# Patient Record
Sex: FEMALE | URBAN - METROPOLITAN AREA
[De-identification: names, ages, dates, MRNs, and addresses within clinical notes are randomized per-mention and may not be internally consistent; named-entity substitution may affect disease eponyms.]

---

## 2023-04-21 ENCOUNTER — EMPLOYEE HEALTH (OUTPATIENT)
Dept: OTHER | Facility: HOSPITAL | Age: 23
End: 2023-04-21
Attending: PREVENTIVE MEDICINE

## 2023-04-21 DIAGNOSIS — Z11.1 SCREENING-PULMONARY TB: Primary | ICD-10-CM

## 2023-04-21 PROCEDURE — 86480 TB TEST CELL IMMUN MEASURE: CPT

## 2023-04-24 LAB
M TB IFN-G CD4+ T-CELLS BLD-ACNC: 0 IU/ML
M TB TUBERC IFN-G BLD QL: NEGATIVE
M TB TUBERC IGNF/MITOGEN IGNF CONTROL: >10 IU/ML
QFT TB1 AG MINUS NIL: 0 IU/ML
QFT TB2 AG MINUS NIL: 0.02 IU/ML

## 2023-06-01 ENCOUNTER — TELEPHONE (OUTPATIENT)
Dept: INTERNAL MEDICINE CLINIC | Facility: HOSPITAL | Age: 23
End: 2023-06-01

## 2023-06-01 ENCOUNTER — LAB ENCOUNTER (OUTPATIENT)
Dept: LAB | Facility: HOSPITAL | Age: 23
End: 2023-06-01
Attending: PREVENTIVE MEDICINE

## 2023-06-01 DIAGNOSIS — Z20.822 SUSPECTED 2019 NOVEL CORONAVIRUS INFECTION: ICD-10-CM

## 2023-06-01 DIAGNOSIS — Z20.822 SUSPECTED 2019 NOVEL CORONAVIRUS INFECTION: Primary | ICD-10-CM

## 2023-06-01 LAB — SARS-COV-2 RNA RESP QL NAA+PROBE: NOT DETECTED

## 2023-06-01 NOTE — TELEPHONE ENCOUNTER
Results and RTW guidelines:    COVID RESULT:    [] Viewed by employee in [facility]. RTW plan and instructions as indicated on triage call. Manager notified. Estimated RTW date:   [x] Discussed with employee   [] Unable to reach by phone. Sent via [employer] message      Test type:    [x] Rapid         [] Alinity         [] Outside test:       [x] NEGATIVE     Ordered Alinity retest?  []Yes   [x] No (skip to RTW)   Ordered Rapid retest?   []Yes   [x] No (skip to RTW)           Dated to be taken:      If Yes, PLACE ORDER NOW and instruct the following:  -Originally Symptomatic or Now Symptoms:   -RTW when sx improve- fever free for 24 hours w/o medications, Diarrhea/Vomiting for 24 hours w/o medications    -Originally  Asymptomatic  -Asymptomatic AND Vaccinated or Unvaccinated or Prior infection in past 90 days:   -May work and continue to monitor symptoms for the next 14 days.                                         -Rapid test day 2, rapid test day 5 (day 0 - exposure)        Notes:     RTW PLAN:    []  If COVID positive results, off work minimum of 5 days from positive test or onset of symptoms (day 0)        On day 5, if asymptomatic or mildly symptomatic (with improving symptoms) may return to work day 6          On day 5, if symptomatic, call Employee Health for RTW screening        []  COVID positive result - call Employee Health on day 5 after symptom onset. The employee needs to be cleared by Employee Health to RTW. [] RTW immediately, continue to monitor for sx  [x] RTW when sx improve; must be fever free for 24 hours w/o medications, Diarrhea/Vomiting free for 24 hours w/o medications  [] Alinity ordered; continue to monitor sx and call for new/worsening sx.   Discuss RTW guidelines with manager  [] May continue to work  [] Follow up with PCP  [] Home until further instruction from hotline with Alinity results  INSTRUCTIONS PROVIDED:  [x]  Plan as noted above  []  Length of time to obtain results   [] Quarantine instructions  []  Masking protocol   []  S/S of worsening infection/condition and importance of prompt medical re-evaluation including when to seek emergency care  [] If symptoms develop, stay home and call hotline for rapid test order    Estimated RTW date:      [x] The employee voiced understanding of above plan/instructions  [x] Manager Notified

## 2024-02-12 ENCOUNTER — TELEPHONE (OUTPATIENT)
Dept: INTERNAL MEDICINE CLINIC | Facility: HOSPITAL | Age: 24
End: 2024-02-12

## 2024-02-12 ENCOUNTER — LAB ENCOUNTER (OUTPATIENT)
Dept: LAB | Facility: HOSPITAL | Age: 24
End: 2024-02-12
Attending: PREVENTIVE MEDICINE
Payer: COMMERCIAL

## 2024-02-12 DIAGNOSIS — Z20.822 EXPOSURE TO CONFIRMED CASE OF COVID-19: Primary | ICD-10-CM

## 2024-02-12 DIAGNOSIS — Z20.822 EXPOSURE TO CONFIRMED CASE OF COVID-19: ICD-10-CM

## 2024-02-12 PROCEDURE — 87635 SARS-COV-2 COVID-19 AMP PRB: CPT

## 2024-02-12 NOTE — TELEPHONE ENCOUNTER
[x] [facility]  []CELESTINE   [] [facility]  Manager : Carlene Portillo    [] Direct Patient Care  []Indirect Patient Contact   [x] Non-Clinical/No Patient Contact    For Direct Patient Care ONLY: Have you been fitted with an N95 mask? [] Yes  []No      HAVE YOU RECEIVED THE COVID-19 Vaccine? Yes [x]    No []          If yes, date(s) received:  02/08/2021;  03/09/2021; 12/07/2021          Which vaccine:  Pfizer []     Moderna [x]    J&J []      SYMPTOMS (reported via dashboard):  [x] asymptomatic  [] symptomatic  [] GI symptoms only    Symptom onset date:   Fever   > 100F             Yes []      Cough                          Yes []      Shortness of breath  Yes []      Congestion                 Yes []      Runny nose                Yes []        Loss of Smell              Yes []        Loss of Taste             Yes []       Sore throat                 Yes []       Fatigue                        Yes []       Body Aches                Yes []        Chills                           Yes []        Headache                   Yes []             GI symptoms             Yes []     No []                     Nausea   []          Vomiting            []                                    Diarrhea  []          Upset stomach []      Employee reported COVID Exposure?  Yes [x]     No []    Date of exposure: 02/09/2024  []  Coworker                       [] patient                        [x] Family/friend    PPE:   [] N95 Mask/PAPR  [] Standard Mask  [] Eyewear  [x] None    Within 6 feet for >15 minutes? [x] Yes []  No    Is this a true exposure? [x]  Yes []  No    When was the last shift you worked?: 02/09/2024    Employee has a history of Covid?  Yes []     No [x]   If Yes, when:    PLAN:     COVID-19 testing ordered:   [] Rapid      [x] Alinity              Date test is to be taken:   02/12/2024    []  No testing required at this time  []  Outside testing                           Notes:    INSTRUCTIONS PROVIDED:    [x]  Employee was instructed to  call Central scheduling at [phone] or use [employer] to make an appointment for their testing   []  May return to work if employee views negative result in [employer] and remains fever, vomiting, and diarrhea free  [x]  May continue to work if remains asymptomatic and views negative result in MyChart  []  Follow up for condition update when resulting  []  If symptoms develop, stay home and call hotline for rapid test order  []  If COVID positive results, off work minimum of 5 days from positive test or onset of symptoms (day 0)     [x]  Plan noted above  [x]  Length of time to obtain results  []  Quarantine instructions  [x]  S/S of worsening infection/condition and importance of prompt medical re-evaluation including when to seek emergency care.   [] The employee voiced understanding

## 2024-02-13 LAB — SARS-COV-2 RNA RESP QL NAA+PROBE: NOT DETECTED

## 2024-02-13 NOTE — TELEPHONE ENCOUNTER
Results and RTW guidelines:    COVID RESULT:    [x] Viewed by employee in [employer].  RTW plan and instructions as indicated on triage call.  Manager notified.  Estimated RTW date: 02/12/2024  [] Discussed with employee   [x] Unable to reach by phone.  Sent via [employer] message      Test type:    [] Rapid         [x] Alinity         [] Outside test:       [x] NEGATIVE     Ordered Alinity retest?  [x]Yes   [] No (skip to RTW)   Ordered Rapid retest?   []Yes   [x] No (skip to RTW)           Dated to be taken:  02/15/2024    If Yes, PLACE ORDER NOW and instruct the following:  -Originally Symptomatic or Now Symptoms:   -RTW when sx improve- fever free for 24 hours w/o medications, Diarrhea/Vomiting for 24 hours w/o medications    -Originally  Asymptomatic  -Asymptomatic AND Vaccinated or Unvaccinated or Prior infection in past 90 days:   -May work and continue to monitor symptoms for the next 10 days.                                         -Alinity test day 2, Alinity test day 5 (day 0 - exposure)

## 2024-07-30 ENCOUNTER — LAB ENCOUNTER (OUTPATIENT)
Dept: LAB | Facility: REFERENCE LAB | Age: 24
End: 2024-07-30
Attending: INTERNAL MEDICINE
Payer: COMMERCIAL

## 2024-07-30 ENCOUNTER — OFFICE VISIT (OUTPATIENT)
Facility: LOCATION | Age: 24
End: 2024-07-30

## 2024-07-30 VITALS
HEART RATE: 83 BPM | SYSTOLIC BLOOD PRESSURE: 113 MMHG | WEIGHT: 160 LBS | OXYGEN SATURATION: 98 % | BODY MASS INDEX: 30.21 KG/M2 | DIASTOLIC BLOOD PRESSURE: 76 MMHG | HEIGHT: 61 IN

## 2024-07-30 DIAGNOSIS — Z13.21 SCREENING FOR ENDOCRINE, NUTRITIONAL, METABOLIC AND IMMUNITY DISORDER: ICD-10-CM

## 2024-07-30 DIAGNOSIS — Z13.0 SCREENING FOR ENDOCRINE, NUTRITIONAL, METABOLIC AND IMMUNITY DISORDER: ICD-10-CM

## 2024-07-30 DIAGNOSIS — Z13.228 SCREENING FOR ENDOCRINE, NUTRITIONAL, METABOLIC AND IMMUNITY DISORDER: ICD-10-CM

## 2024-07-30 DIAGNOSIS — Z11.3 SCREENING FOR STD (SEXUALLY TRANSMITTED DISEASE): ICD-10-CM

## 2024-07-30 DIAGNOSIS — Z23 NEED FOR VACCINATION: ICD-10-CM

## 2024-07-30 DIAGNOSIS — Z00.00 ANNUAL PHYSICAL EXAM: Primary | ICD-10-CM

## 2024-07-30 DIAGNOSIS — E55.9 VITAMIN D DEFICIENCY: ICD-10-CM

## 2024-07-30 DIAGNOSIS — Z13.29 SCREENING FOR ENDOCRINE, NUTRITIONAL, METABOLIC AND IMMUNITY DISORDER: ICD-10-CM

## 2024-07-30 LAB
ALBUMIN SERPL-MCNC: 4.3 G/DL (ref 3.2–4.8)
ALBUMIN/GLOB SERPL: 1.5 {RATIO} (ref 1–2)
ALP LIVER SERPL-CCNC: 93 U/L
ALT SERPL-CCNC: 13 U/L
ANION GAP SERPL CALC-SCNC: 6 MMOL/L (ref 0–18)
AST SERPL-CCNC: 20 U/L (ref ?–34)
BILIRUB SERPL-MCNC: 0.6 MG/DL (ref 0.3–1.2)
BUN BLD-MCNC: 13 MG/DL (ref 9–23)
BUN/CREAT SERPL: 14.9 (ref 10–20)
CALCIUM BLD-MCNC: 9.5 MG/DL (ref 8.7–10.4)
CHLORIDE SERPL-SCNC: 107 MMOL/L (ref 98–112)
CHOLEST SERPL-MCNC: 177 MG/DL (ref ?–200)
CO2 SERPL-SCNC: 27 MMOL/L (ref 21–32)
CREAT BLD-MCNC: 0.87 MG/DL
DEPRECATED RDW RBC AUTO: 41.5 FL (ref 35.1–46.3)
EGFRCR SERPLBLD CKD-EPI 2021: 97 ML/MIN/1.73M2 (ref 60–?)
ERYTHROCYTE [DISTWIDTH] IN BLOOD BY AUTOMATED COUNT: 12.1 % (ref 11–15)
EST. AVERAGE GLUCOSE BLD GHB EST-MCNC: 97 MG/DL (ref 68–126)
FASTING PATIENT LIPID ANSWER: NO
FASTING STATUS PATIENT QL REPORTED: NO
GLOBULIN PLAS-MCNC: 2.8 G/DL (ref 2–3.5)
GLUCOSE BLD-MCNC: 82 MG/DL (ref 70–99)
HBA1C MFR BLD: 5 % (ref ?–5.7)
HBV SURFACE AG SER-ACNC: 0.52 [IU]/L
HBV SURFACE AG SERPL QL IA: NONREACTIVE
HCT VFR BLD AUTO: 38.3 %
HCV AB SERPL QL IA: NONREACTIVE
HDLC SERPL-MCNC: 52 MG/DL (ref 40–59)
HGB BLD-MCNC: 12.6 G/DL
LDLC SERPL CALC-MCNC: 106 MG/DL (ref ?–100)
MCH RBC QN AUTO: 30.5 PG (ref 26–34)
MCHC RBC AUTO-ENTMCNC: 32.9 G/DL (ref 31–37)
MCV RBC AUTO: 92.7 FL
NONHDLC SERPL-MCNC: 125 MG/DL (ref ?–130)
OSMOLALITY SERPL CALC.SUM OF ELEC: 289 MOSM/KG (ref 275–295)
PLATELET # BLD AUTO: 389 10(3)UL (ref 150–450)
POTASSIUM SERPL-SCNC: 4 MMOL/L (ref 3.5–5.1)
PROT SERPL-MCNC: 7.1 G/DL (ref 5.7–8.2)
RBC # BLD AUTO: 4.13 X10(6)UL
SODIUM SERPL-SCNC: 140 MMOL/L (ref 136–145)
T PALLIDUM AB SER QL IA: NONREACTIVE
TRIGL SERPL-MCNC: 106 MG/DL (ref 30–149)
TSI SER-ACNC: 2.2 MIU/ML (ref 0.55–4.78)
VIT D+METAB SERPL-MCNC: 13.6 NG/ML (ref 30–100)
VLDLC SERPL CALC-MCNC: 18 MG/DL (ref 0–30)
WBC # BLD AUTO: 8.9 X10(3) UL (ref 4–11)

## 2024-07-30 PROCEDURE — 80053 COMPREHEN METABOLIC PANEL: CPT | Performed by: INTERNAL MEDICINE

## 2024-07-30 PROCEDURE — 87389 HIV-1 AG W/HIV-1&-2 AB AG IA: CPT

## 2024-07-30 PROCEDURE — 86803 HEPATITIS C AB TEST: CPT

## 2024-07-30 PROCEDURE — 80061 LIPID PANEL: CPT | Performed by: INTERNAL MEDICINE

## 2024-07-30 PROCEDURE — 85027 COMPLETE CBC AUTOMATED: CPT | Performed by: INTERNAL MEDICINE

## 2024-07-30 PROCEDURE — 36415 COLL VENOUS BLD VENIPUNCTURE: CPT | Performed by: INTERNAL MEDICINE

## 2024-07-30 PROCEDURE — 90472 IMMUNIZATION ADMIN EACH ADD: CPT | Performed by: INTERNAL MEDICINE

## 2024-07-30 PROCEDURE — 82306 VITAMIN D 25 HYDROXY: CPT | Performed by: INTERNAL MEDICINE

## 2024-07-30 PROCEDURE — 87340 HEPATITIS B SURFACE AG IA: CPT

## 2024-07-30 PROCEDURE — 86780 TREPONEMA PALLIDUM: CPT

## 2024-07-30 PROCEDURE — 83036 HEMOGLOBIN GLYCOSYLATED A1C: CPT | Performed by: INTERNAL MEDICINE

## 2024-07-30 PROCEDURE — 90715 TDAP VACCINE 7 YRS/> IM: CPT | Performed by: INTERNAL MEDICINE

## 2024-07-30 PROCEDURE — 84443 ASSAY THYROID STIM HORMONE: CPT | Performed by: INTERNAL MEDICINE

## 2024-07-30 PROCEDURE — 90471 IMMUNIZATION ADMIN: CPT | Performed by: INTERNAL MEDICINE

## 2024-07-30 PROCEDURE — 90651 9VHPV VACCINE 2/3 DOSE IM: CPT | Performed by: INTERNAL MEDICINE

## 2024-07-30 PROCEDURE — 87591 N.GONORRHOEAE DNA AMP PROB: CPT

## 2024-07-30 PROCEDURE — 99385 PREV VISIT NEW AGE 18-39: CPT | Performed by: INTERNAL MEDICINE

## 2024-07-30 PROCEDURE — 87491 CHLMYD TRACH DNA AMP PROBE: CPT

## 2024-07-30 NOTE — PATIENT INSTRUCTIONS
Constipation:  Start 1 cap of MiraLAX daily to help soften your stools, this can be mixed with prune juice or water.  MiraLAX acts like a sponge in your gut to help your stools absorb water and therefore soften them.  Stop using MiraLAX if you have diarrhea.  After 2 servings of MiraLAX start taking senna, this is a stimulant laxative to help stimulate the bowels and create a bowel movement.  Take this daily for the next 3 to 5 days or until you have a bowel movement.  If you have pain stop senna, repeat step 1 for another day, then resume senna.  Drink at least 2 to 3 L of water per day  Increase your physical activity with walking or running to help move your abdominal muscles  You should have a good bowel movement over the next few days, once you produce a bowel movement then start using Metamucil daily for regularity.

## 2024-07-30 NOTE — PROGRESS NOTES
INTERNAL MEDICINE ANNUAL EXAM NOTE     Patient ID: Ree Burgos is a 24 year old female.  Chief Complaint: Physical (Patient here for a Physical )      Ree Burgos is a pleasant 24 year old female who presents for annual physical exam. Ree Burgos is doing well today.  Discussed weight loss, on phentermine.       Health Maintenance  - All care gaps addressed with patient.     Review of Systems  Review of Systems   Constitutional:  Negative for unexpected weight change.   HENT:  Negative for hearing loss.    Eyes:  Negative for pain and visual disturbance.   Respiratory:  Negative for shortness of breath.    Cardiovascular:  Negative for chest pain, palpitations and leg swelling.   Gastrointestinal:  Negative for abdominal pain and blood in stool.   Genitourinary:  Negative for difficulty urinating and hematuria.   Neurological:  Negative for tremors and syncope.   Psychiatric/Behavioral: Negative.         Physical Exam  Vitals:    07/30/24 1649   BP: 113/76   Pulse: 83   SpO2: 98%   Weight: 160 lb (72.6 kg)   Height: 5' 1\" (1.549 m)     Body mass index is 30.23 kg/m².  BP Readings from Last 3 Encounters:   07/30/24 113/76     Physical Exam  Vitals and nursing note reviewed.   Constitutional:       General: She is not in acute distress.     Appearance: Normal appearance.   HENT:      Head: Normocephalic.      Right Ear: External ear normal.      Left Ear: External ear normal.   Eyes:      Extraocular Movements: Extraocular movements intact.      Conjunctiva/sclera: Conjunctivae normal.   Pulmonary:      Effort: Pulmonary effort is normal.   Musculoskeletal:         General: Normal range of motion.      Cervical back: Normal range of motion and neck supple.   Skin:     Coloration: Skin is not jaundiced.   Neurological:      General: No focal deficit present.      Mental Status: She is alert and oriented to person, place, and time. Mental status is at baseline.   Psychiatric:         Mood and  Affect: Mood normal.         Behavior: Behavior normal.           Labs & Imaging  Pertinent labs and imaging reviewed.   No results found for: \"GLU\", \"BUN\", \"BUNCREA\", \"CREATSERUM\", \"ANIONGAP\", \"GFR\", \"GFRNAA\", \"GFRAA\", \"CA\", \"OSMOCALC\", \"ALKPHO\", \"AST\", \"ALT\", \"ALKPHOS\", \"BILT\", \"TP\", \"ALB\", \"GLOBULIN\", \"AGRATIO\", \"NA\", \"K\", \"CL\", \"CO2\"  No results found for: \"EAG\", \"A1C\"  No results found for: \"WBC\", \"RBC\", \"HGB\", \"HCT\", \"MCV\", \"MCH\", \"MCHC\", \"RDW\", \"PLT\", \"MPV\"  No results found for: \"CHOLEST\", \"TRIG\", \"HDL\", \"LDL\", \"VLDL\", \"TCHDLRATIO\", \"NONHDLC\", \"CHOLHDLRATIO\", \"CALCNONHDL\"  The ASCVD Risk score (Ivette DK, et al., 2019) failed to calculate for the following reasons:    The 2019 ASCVD risk score is only valid for ages 40 to 79    Medical History    Reviewed allergies:  No Known Allergies     Reviewed:  There are no problems to display for this patient.     Reviewed:  History reviewed. No pertinent past medical history.   Reviewed:  History reviewed. No pertinent family history.    Reviewed:  History reviewed. No pertinent surgical history.   Reviewed:  Social History     Socioeconomic History    Marital status: Single   Tobacco Use    Smoking status: Never    Smokeless tobacco: Never     Social Determinants of Health     Financial Resource Strain: Not on File (10/7/2022)    Received from [facility]    Financial Resource Strain     Financial Resource Strain: 0   Food Insecurity: Not on File (10/7/2022)    Received from [facility]    Food Insecurity     Food: 0   Transportation Needs: Not on File (10/7/2022)    Received from [facility]    Transportation Needs     Transportation: 0   Physical Activity: Not on File (10/7/2022)    [facility] from [facility]    Physical Activity     Physical Activity: 0   Stress: Not on File (10/7/2022)    Received from [facility]    Stress     Stress: 0   Social Connections: Not on File (10/7/2022)    Received from [facility]    Social Connections     Social Connections  and Isolation: 0   Housing Stability: Not on File (10/7/2022)    Received from [facility]    Housing Stability     Housin      Reviewed:  No current outpatient medications on file.          Assessment & Plan    1. Annual physical exam  - Comp Metabolic Panel (14)  - Hemoglobin A1C  - CBC, Platelet; No Differential  - Lipid Panel  - TSH W Reflex To Free T4    2. Screening for endocrine, nutritional, metabolic and immunity disorder  - Comp Metabolic Panel (14)  - Hemoglobin A1C  - CBC, Platelet; No Differential  - Lipid Panel  - TSH W Reflex To Free T4  - Vitamin D    3. Vitamin D deficiency  - Vitamin D    4. Need for vaccination  - HPV 1st Dose (Today)  - HPV Vaccine 2nd Dose (Future 1-2 months); Future  - HPV Vaccine 3rd Dose (Future 6 months); Future  - TdaP (Boostrix) Vaccine (> 7 Y)    5. Screening for STD (sexually transmitted disease)  - HCV Antibody; Future  - Hepatitis B Surface Antigen; Future  - HIV AG AB Combo; Future  - T PALLIDUM SCREENING CASCADE; Future  - Chlamydia/Gc Amplification; Future  Plan  Overall doing well today. Screening labs, preventive imaging/procedure, and health care gaps addressed as per USPSTF guidelines, orders available electronically via [employer] and in [facility].  Vaccines discussed and administered depending on availability and per patient preference; patient to return for any outstanding vaccines once available.  Patient brought to  to help schedule care gaps and follow up. Further recommendations depending on lab results.            Follow Up:   Return for 1 YEAR FOR ANNUAL OR DEPENDING ON LAB RESULTS.      Clinton Murray MD  Internal Medicine      Patient asked to sign release of information for outside records if not already requested, make future office/imaging appointments at the  prior to leaving, and to sign up for [facility] [facility] if not already active.  Preventive measures and further education discussed with patient as per after visit summary. Potential  medication side effects discussed. All questions answered to best of ability.   Call office with any questions. Seek emergency care if necessary.   Patient understands and agrees to follow directions and advice.      ----------------------------------------- PATIENT INSTRUCTIONS-----------------------------------------     Patient Instructions   Constipation:  Start 1 cap of MiraLAX daily to help soften your stools, this can be mixed with prune juice or water.  MiraLAX acts like a sponge in your gut to help your stools absorb water and therefore soften them.  Stop using MiraLAX if you have diarrhea.  After 2 servings of MiraLAX start taking senna, this is a stimulant laxative to help stimulate the bowels and create a bowel movement.  Take this daily for the next 3 to 5 days or until you have a bowel movement.  If you have pain stop senna, repeat step 1 for another day, then resume senna.  Drink at least 2 to 3 L of water per day  Increase your physical activity with walking or running to help move your abdominal muscles  You should have a good bowel movement over the next few days, once you produce a bowel movement then start using Metamucil daily for regularity.

## 2024-07-31 LAB
C TRACH DNA SPEC QL NAA+PROBE: NEGATIVE
N GONORRHOEA DNA SPEC QL NAA+PROBE: NEGATIVE

## 2024-10-16 ENCOUNTER — HOSPITAL ENCOUNTER (EMERGENCY)
Facility: HOSPITAL | Age: 24
Discharge: HOME | End: 2024-10-16
Attending: EMERGENCY MEDICINE
Payer: COMMERCIAL

## 2024-10-16 VITALS
DIASTOLIC BLOOD PRESSURE: 85 MMHG | WEIGHT: 152 LBS | BODY MASS INDEX: 29 KG/M2 | TEMPERATURE: 97 F | HEART RATE: 88 BPM | OXYGEN SATURATION: 100 % | RESPIRATION RATE: 18 BRPM | SYSTOLIC BLOOD PRESSURE: 122 MMHG

## 2024-10-16 DIAGNOSIS — R59.0 CERVICAL LYMPHADENOPATHY: ICD-10-CM

## 2024-10-16 DIAGNOSIS — U07.1 COVID: ICD-10-CM

## 2024-10-16 DIAGNOSIS — J01.90 ACUTE SINUSITIS, RECURRENCE NOT SPECIFIED, UNSPECIFIED LOCATION: Primary | ICD-10-CM

## 2024-10-16 LAB
FLUAV + FLUBV RNA SPEC NAA+PROBE: NEGATIVE
FLUAV + FLUBV RNA SPEC NAA+PROBE: NEGATIVE
RSV RNA SPEC NAA+PROBE: NEGATIVE
SARS-COV-2 RNA RESP QL NAA+PROBE: DETECTED

## 2024-10-16 PROCEDURE — 99283 EMERGENCY DEPT VISIT LOW MDM: CPT

## 2024-10-16 PROCEDURE — 0241U SARS-COV-2/FLU A AND B/RSV BY PCR (GENEXPERT): CPT

## 2024-10-16 PROCEDURE — 0241U SARS-COV-2/FLU A AND B/RSV BY PCR (GENEXPERT): CPT | Performed by: EMERGENCY MEDICINE

## 2024-10-16 NOTE — DISCHARGE INSTRUCTIONS
Augmentin twice a day for 10 days.  Try to get a second dose in late this evening.  Acetaminophen (Tylenol) 1000 mg every 4-6 hrs and/or Ibuprofen (Motrin or Advil) 600 mg every 6 hrs as needed for fever or discomfort.    Push fluids and rest.    Followup with PMD if not improved in 3 to 5 days.   Return immediately if symptoms worsen or other concerns develop.

## 2024-10-16 NOTE — ED INITIAL ASSESSMENT (HPI)
Patient arrives ambulatory through triage with complaint of swollen lymph nodes in the neck x2 weeks. Per report feeling rapid heart beat intermittently along with sinus pressure and left sided neck pain. Patient states she took mucinex PTA. Cough, sore throat and congestion.

## 2024-10-16 NOTE — ED PROVIDER NOTES
Patient Seen in: [facility] Emergency Department      History     Chief Complaint   Patient presents with    Cough/URI     Stated Complaint: swollen lymphmodes, fever    Subjective:   HPI      Patient is a 24-year-old said that she has had some congestion with some mildly enlarged lymph nodes in her neck for the last 2 weeks.  Over the last 3 to 4 days she has had more pressure sensation in her sinuses.  Mild soreness to the throat.  No shortness of breath.  No significant coughing.  No vomiting or diarrhea.  No abdominal pain.    Objective:     History reviewed. No pertinent past medical history.           History reviewed. No pertinent surgical history.             Social History     Socioeconomic History    Marital status: Single   Tobacco Use    Smoking status: Never     Passive exposure: Never    Smokeless tobacco: Never   Vaping Use    Vaping status: Never Used   Substance and Sexual Activity    Alcohol use: Yes     Comment: occassionally    Drug use: Never     Social Drivers of Health     Financial Resource Strain: Not on File (10/7/2022)    Received from [facility]    Financial Resource Strain     Financial Resource Strain: 0   Food Insecurity: Not on File (2024)    Received from [facility]    Food Insecurity     Food: 0   Transportation Needs: Not on File (10/7/2022)    Received from [facility]    Transportation Needs     Transportation: 0   Physical Activity: Not on File (10/7/2022)    Received from [facility]    Physical Activity     Physical Activity: 0   Stress: Not on File (10/7/2022)    Received from [facility]    Stress     Stress: 0   Social Connections: Not on File (2024)    Received from [facility]    Social Connections     Connectedness: 0   Housing Stability: Not on File (10/7/2022)    Received from [facility]    Housing Stability     Housin                  Physical Exam     ED Triage Vitals [10/16/24 1526]   /85   Pulse 89   Resp 18   Temp 97.1 °F (36.2 °C)   Temp  src Temporal   SpO2 98 %   O2 Device None (Room air)       Current Vitals:   Vital Signs  BP: 122/85  Pulse: 88  Resp: 18  Temp: 97.1 °F (36.2 °C)  Temp src: Temporal  MAP (mmHg): 100    Oxygen Therapy  SpO2: 100 %  O2 Device: None (Room air)        Physical Exam  GENERAL: Patient is awake, alert, active and interactive.  HEENT: Patient complains of pain with pressure over the maxillary and frontal sinuses.  I believe the patient symptoms are consistent with sinusitis posterior pharynx shows mild erythema but no exudate.  Uvula midline.  No drooling or stridor.  Tympanic membrane's are pearly white bilaterally.  Normal light reflex and normal landmarks.  Conjunctiva are clear.  Pupils are equal round reactive to light.    Neck is supple with no pain to movement.  Some minimally enlarged anterior cervical lymph nodes.  No overlying erythema.  No fluctuance.  CHEST: Patient is breathing comfortably.  Lungs are clear bilaterally  HEART: Regular rate and rhythm no murmur  ABDOMEN: nondistended, nontender  EXTREMITIES: Normal capillary refill.  SKIN: Well perfused, without cyanosis.  No rashes.  NEUROLOGIC: No focal deficits visualized.    ED Course     Labs Reviewed   SARS-COV-2/FLU A AND B/RSV BY PCR (GEN[facility]) - Abnormal; Notable for the following components:       Result Value    SARS-CoV-2 (COVID-19) - ([facility]) Detected (*)     All other components within normal limits    Narrative:     This test is intended for the qualitative detection and differentiation of SARS-CoV-2, influenza A, influenza B, and respiratory syncytial virus (RSV) viral RNA in nasopharyngeal or nares swabs from individuals suspected of respiratory viral infection consistent with COVID-19 by their healthcare provider. Signs and symptoms of respiratory viral infection due to SARS-CoV-2, influenza, and RSV can be similar.    Test performed using the Xpert Xpress SARS-CoV-2/FLU/RSV (real time RT-PCR)  assay on the Gene[employer]pert instrument, [employer],  [place] [address].   This test is being used under the Food and Drug Administration's Emergency Use Authorization.    The authorized Fact Sheet for Healthcare Providers for this assay is available upon request from the laboratory.            I believe the patient's history of physical examination consistent with COVID-19 infection with developing secondary sinusitis and some mild reactive cervical lymphadenopathy.       MDM      First dose of Augmentin was given the ED prior to discharge.      Patient was screened and evaluated during this visit.   As a treating physician attending to the patient, I determined, within reasonable clinical confidence and prior to discharge, that an emergency medical condition was not or was no longer present.  There was no indication for further evaluation, treatment or admission on an emergency basis.  Comprehensive verbal and written discharge and follow-up instructions were provided to help prevent relapse or worsening.    Patient was instructed to follow-up with the primary care provider for further evaluation and treatment, but to return immediately to the ER for worsening, concerning, new, changing, or persisting symptoms.    I discussed my assessment and plan and answered all questions prior to discharge.  Patient/family expressed understanding and agreement with the plan.      Patient is alert, interactive, and in no distress upon discharge.    This report has been produced using speech recognition software and may contain errors related to that system including, but not limited to, errors in grammar, punctuation, and spelling, as well as words and phrases that possibly may have been recognized inappropriately.  If there are any questions or concerns, contact the dictating provider for clarification.    Medical Decision Making      Disposition and Plan     Clinical Impression:  1. Acute sinusitis, recurrence not specified, unspecified location    2. Cervical lymphadenopathy     3. COVID         Disposition:  Discharge  10/16/2024  4:12 pm    Follow-up:  [facility] Emergency Department  [address] [facility] [address]  [phone]  Follow up  Immediately if symptoms worsen, increased concerns          Medications Prescribed:  Current Discharge Medication List        START taking these medications    Details   amoxicillin clavulanate 875-125 MG Oral Tab Take 1 tablet by mouth 2 (two) times daily for 10 days.  Qty: 20 tablet, Refills: 0                 Supplementary Documentation:

## 2024-12-10 ENCOUNTER — OFFICE VISIT (OUTPATIENT)
Dept: FAMILY MEDICINE CLINIC | Facility: CLINIC | Age: 24
End: 2024-12-10
Payer: COMMERCIAL

## 2024-12-10 VITALS
SYSTOLIC BLOOD PRESSURE: 118 MMHG | HEIGHT: 62 IN | HEART RATE: 85 BPM | RESPIRATION RATE: 18 BRPM | WEIGHT: 154 LBS | DIASTOLIC BLOOD PRESSURE: 80 MMHG | OXYGEN SATURATION: 99 % | BODY MASS INDEX: 28.34 KG/M2 | TEMPERATURE: 98 F

## 2024-12-10 DIAGNOSIS — J35.1 LARGE TONSILS: ICD-10-CM

## 2024-12-10 DIAGNOSIS — J02.9 SORE THROAT: Primary | ICD-10-CM

## 2024-12-10 LAB
CONTROL LINE PRESENT WITH A CLEAR BACKGROUND (YES/NO): YES YES/NO
KIT LOT #: NORMAL NUMERIC

## 2024-12-10 PROCEDURE — 87880 STREP A ASSAY W/OPTIC: CPT | Performed by: NURSE PRACTITIONER

## 2024-12-10 PROCEDURE — 99213 OFFICE O/P EST LOW 20 MIN: CPT | Performed by: NURSE PRACTITIONER

## 2024-12-11 NOTE — PROGRESS NOTES
CHIEF COMPLAINT:     Chief Complaint   Patient presents with    Sore Throat     Swollen tonsils. For a week  ( no other symptoms )   OTC: none          HPI:   Ree Burgos is a 24 year old female presents to clinic with complaint of sore throat. Patient has had for 7 days. Patient reports following associated symptoms: swollen tonsils. Denies nasal congestion, cough.    Denies fever, chills, rash, nausea, headache, ear pain.  Has history of strep throat. no is sick at home.  no known strep or mono exposure.   Treating symptoms with none.    No current outpatient medications on file.      No past medical history on file.   Social History:  Social History     Socioeconomic History    Marital status: Single   Tobacco Use    Smoking status: Never     Passive exposure: Never    Smokeless tobacco: Never   Vaping Use    Vaping status: Never Used   Substance and Sexual Activity    Alcohol use: Yes     Comment: occassionally    Drug use: Never     Social Drivers of Health     Financial Resource Strain: Not on File (10/7/2022)    Received from [facility]    Financial Resource Strain     Financial Resource Strain: 0   Food Insecurity: Not on File (2024)    Received from [facility]    Food Insecurity     Food: 0   Transportation Needs: Not on File (10/7/2022)    Received from [facility]    Transportation Needs     Transportation: 0   Physical Activity: Not on File (10/7/2022)    Received from [facility]    Physical Activity     Physical Activity: 0   Stress: Not on File (10/7/2022)    Received from [facility]    Stress     Stress: 0   Social Connections: Not on File (2024)    Received from [facility]    Social Connections     Connectedness: 0   Housing Stability: Not on File (10/7/2022)    Received from [facility]    Housing Stability     Housin        REVIEW OF SYSTEMS:   GENERAL HEALTH: feels well otherwise, good appetite  SKIN: denies any unusual skin lesions or rashes  HEENT: denies ear pain, See  HPI  RESPIRATORY: denies shortness of breath or wheezing  CARDIOVASCULAR: denies chest pain or palpitations   GI: denies vomiting or diarrhea  NEURO: denies dizziness or lightheadedness    EXAM:   /80 (Patient Position: Sitting, Cuff Size: adult)   Pulse 85   Temp 97.5 °F (36.4 °C) (Temporal)   Resp 18   Ht 5' 2\" (1.575 m)   Wt 154 lb (69.9 kg)   LMP 11/28/2024 (Exact Date)   SpO2 99%   BMI 28.17 kg/m²   GENERAL: well developed, well nourished,in no apparent distress  SKIN: no rashes,no suspicious lesions  HEAD: atraumatic, normocephalic  EYES: conjunctiva clear, EOM intact  EARS: TM's clear, non-injected, no bulging, retraction, or fluid bilaterally  NOSE: nostrils patent, no exudates, nasal mucosa pink and noninflamed  THROAT: oral mucosa pink, moist. Posterior pharynx not erythematous and injected. No exudates. Tonsils 3+/4. No trismus, hoarseness, muffled voice, stridor, or uvular deviation.    NECK: supple, non-tender  LUNGS: clear to auscultation bilaterally; no wheezes, rales, or rhonchi. Breathing is non labored.  CARDIO: RRR without murmur  EXTREMITIES: no cyanosis, clubbing or edema  LYMPH: bilateral anterior cervical lymphadenopathy. No  posterior cervical or occipital lymphadenopathy.    Recent Results (from the past 24 hours)   Strep A Assay W/Optic    Collection Time: 12/10/24  6:05 PM   Result Value Ref Range    Strep Grp A Screen neg Negative    Control Line Present with a clear background (yes/no) yes Yes/No    Kit Lot # [id number] Numeric    Kit Expiration Date 11/4/25 Date         ASSESSMENT AND PLAN:   Assessment: 1.   Encounter Diagnoses   Name Primary?    Sore throat Yes    Large tonsils      Rapid strep screen is negative   F/u with ENT    Plan: Discussed that due to symptoms and negative rapid strep this is most likely viral and does not require antibiotics.   Comfort measures explained and discussed as listed in Patient Instructions  Follow up with PCP in 3-5 days if not  improving, condition worsens, or fever greater than or equal to 100.4 persists for 72 hours.      The patient/parent indicates understanding of these issues and agrees to the plan.

## 2025-03-19 ENCOUNTER — OFFICE VISIT (OUTPATIENT)
Dept: OTOLARYNGOLOGY | Facility: CLINIC | Age: 25
End: 2025-03-19
Payer: COMMERCIAL

## 2025-03-19 DIAGNOSIS — R19.8 TEETH CLENCHING: ICD-10-CM

## 2025-03-19 DIAGNOSIS — F45.8 BRUXISM: ICD-10-CM

## 2025-03-19 DIAGNOSIS — J35.8 TONSIL STONE: ICD-10-CM

## 2025-03-19 DIAGNOSIS — K21.9 LPRD (LARYNGOPHARYNGEAL REFLUX DISEASE): Primary | ICD-10-CM

## 2025-03-19 DIAGNOSIS — M26.629 TMJPDS (TEMPOROMANDIBULAR JOINT PAIN DYSFUNCTION SYNDROME): ICD-10-CM

## 2025-03-19 PROCEDURE — 99204 OFFICE O/P NEW MOD 45 MIN: CPT | Performed by: OTOLARYNGOLOGY

## 2025-03-19 NOTE — PROGRESS NOTES
The following individual(s) verbally consented to be recorded using ambient AI listening technology and understand that they can each withdraw their consent to this listening technology at any point by asking the clinician to turn off or pause the recording:    Patient consents YES to [facility] [employer]    Patient name: Ree Burgos

## 2025-03-19 NOTE — PATIENT INSTRUCTIONS
Laryngopharyngeal Reflux (LPR) \"Acid Reflux\"        Your stomach produces acid to help break down food so it is easier to digest.  It is prevented from backing up or refluxing into your esophagus (food pipe) and throat by a band of muscle at the top of the stomach known as the lower esophageal sphincter.  If stomach acid comes up into the esophagus, it is termed Gastro-Esophageal Reflux (MANAN).  There is another valve at the top of the esophagus, the upper esophageal sphincter.  If this band of muscle is not functioning well, you can have a backflow of acid up into the sensitive tissue at the back of the throat, larynx (voice box), and even the back of the nasal airway.  This is called laryngopharyngeal reflux (LPR).  LPR is different than gastro-esophageal reflux (MANAN). Typically, individuals with MANAN suffer from heartburn.  Although  some persons with LPR do suffer from heartburn, most persons with LPR do not.  LPR can also affect the lungs and may exacerbate asthma, emphysema, or bronchitis.  Some vocal symptoms result from direct irritation from acidic stomach secretions, while other symptoms may result from tightening of the muscles in the larynx and neck in response to the irritation.      The following are symptoms that may be consistent with acid reflux irritation:  - Frequent throat clearing                              - Feeling like you are choking  - Chronic cough  - Cough that wakes you from your sleep  - Hoarseness  - Trouble swallowing  - Sensation of having “a lump in the throat”  - Thick or too much mucous  - Sour or acidic taste in mouth  - Recurrent sore throat     Treatment for LPR involves keeping stomach contents where they belong and neutralizing stomach acid.  To help manage reflux, the following diet and lifestyle changes are recommended.     Obesity promotes reflux.  Lose weight, if you need to.  Avoid tight clothing around the midsection of the body and avoid bending after meals, both of  which squeeze the abdomen and increases the chances of reflux.  Do not exercise after meals or within two hours of bedtime.  Reduce stress. Stress can create increased acid secretion.  Limit aspirin and ibuprofen.  They may increase stomach irritation.  Stop smoking as nicotine increases reflux tendencies.  Elevate the head of your bed on blocks, 6 inches.  Extra pillows are not as effective.  Watch when you eat:  Eat dinner at least 3 hours before you lie down.  Do not snack after the evening meal.  Eating more frequent, smaller meals may help reduce reflux tendencies.  Watch what you eat:  Avoid fatty foods.  High fat foods can increase acid secretion, decrease lower esophageal  Function, or slow down the emptying of the stomach.  - Avoid spicy foods.  Some spices may irritate the esophageal lining.  - Avoid acidic foods.  Some spices may irritate the esophageal lining.  - Avoid chocolate, nuts, peppermint, alcohol, caffeine, and fizzy beverages.  These affect the lower esophageal sphincter and increase the likelihood of reflux.                                                               DIETARY   PLAN                                                                       FOOD GROUPS                  Group             Recommend Avoid/Limit Consumption   Milk or milk products Skim, 1%or 2% low -fat milk or fat-free yogurt Whole milk (4%), chocolate milk   Vegetables All other vegetables Fried or creamy style vegetables, tomatoes (sauces, pizza, ketchup)   Fruits Apples, melons, bananas,  pears Citrus fruits such as oranges, berries, grapefruit, pineapple, kiwi, peaches   Breads & Grains All those made with low-fat content Any prepared with whole milk or high-fat   Meat & Meat substitutes Low-fat meat, chicken, fish, turkey Spicy cold cuts, sausage, pearson, fatty meat, chicken fat/skin   Fats & Oils None or small amounts Keep amount limited on a given day      Sweets & Desserts All  items made with no or low fat  (less than or equal to 3 g fat/serving) Chocolate, desserts made with high amounts of oils and/or fats      Beverages    Water, juices (except citrus) Alcohol, coffee (regular or decaffeinated), carbonated beverages, tea (green or black), pop   Spices All other spices that do not appear to have a negative effect Hot mustard, vinegar, hot peppers, ziegler, garlic, onion      Medication for Acid Reflux  Along with diet and lifestyle changes, your doctor may prescribe medication to help treat your acid reflux.The choice of medication will be based on you symptoms and test results.  As with any medication, if you experience side effects, call your doctor.     - Reducing Stomach Acid:  Your doctor may suggest antacids that you can buy over the counter         (i.e., Tums, Mylanta, Maalox), or you may be told to take a type of medication called H-2 blockers              (i.e., Tagamet, Pepcid, Zantac).  They block histamine 2, which signals the stomach to make acid.  - Blocking Stomach Acid: In more sever cases, your doctor may prescribe stronger medication (i.e.,  - Proton pump inhibitors (Protonix, Prevacid, Nexium).  Theses inhibit acid secretion.        You hold the key to controlling reflux.  Work with your physician, take any medications as directed, and follow the diet and lifestyle changes detailed in this handout.  In this way, you can help free yourself from the symptoms of laryngopharyngeal reflux (LPR).

## 2025-03-19 NOTE — PROGRESS NOTES
NEW PATIENT PROGRESS NOTE  OTOLOGY/OTOLARYNGOLOGY    REF MD:  No referring provider defined for this encounter.    PCP: Clinton Murray MD    CHIEF COMPLAINT:    Chief Complaint   Patient presents with    Consult     Patient is here due to being referred by pcp due to swollen lymph nodes. Reports nasal congestion x 4 months       History of Present Illness  Ree Burgos is a 25 year old female who presents with recurrent tonsil swelling and sinus symptoms.    She experiences recurrent tonsil swelling, primarily affecting one side at a time, with episodes lasting about one to two weeks. This has been occurring intermittently since October. Associated symptoms include sore throat and a burning sensation in the nose, which coincide with the tonsil swelling.    She describes facial pressure and pain, particularly around the sinuses and ears, occurring approximately once a month and lasting for one to two weeks. There is a sensation of nasal dryness and burning, with more congestion on the left side. She has been informed of a nasal septal deviation causing more obstruction on the right side.    She frequently experiences headaches, which she attributes to her work-from-home environment and prolonged computer use. The headaches are constant and may be exacerbated by stress, with sensitivity to light during these episodes.    She acknowledges grinding her teeth at night, as noted by her brother, and experiences jaw tension during the day. She has not previously sought treatment for this issue.    She experiences heartburn intermittently, correlating with her menstrual cycle. She has reduced her coffee intake to two to three times a week due to concerns about acid reflux, describing her consumption as medium-sized cups. No significant sneezing or itchy, watery eyes, although she occasionally experiences watery eyes.      PAST MEDICAL HISTORY:  History reviewed. No pertinent past medical history.    PAST SURGICAL HISTORY:   History reviewed. No pertinent surgical history.    Medications Ordered Prior to Encounter[1]    Allergies: Allergies[2]    SOCIAL HISTORY:    Social History     Tobacco Use    Smoking status: Never     Passive exposure: Never    Smokeless tobacco: Never   Substance Use Topics    Alcohol use: Yes     Comment: occassionally       History reviewed. No pertinent family history.    REVIEW OF SYSTEMS:   PER HPI    EXAMINATION:  I washed my hands with an alcohol-based hand gel prior to examination  Constitutional:   --Vitals: Last menstrual period 11/28/2024.  General: no apparent distress, well-developed  Respiratory: No stridor, stertor or increased work of breathing  ENT:  --Nose: no external nasal deformity, anterior rhinoscopy: Nasal septal deviation present, obstructing right side more than left, no inferior turbinate hypertrophy, mucosa healthy, no rhinorrhea  --OC/OP: No trismus. No masses or lesions noted over the gingiva, buccal mucosa, tongue, FOM, hard/soft palate, tonsillar pillars, posterior pharyngeal wall. Tonsils are 2-3+ and soft. FOM/BOT are soft.   --Neck: No palpable cervical lymphadenopathy, no thyromegaly, no masses or lesions over the bilateral submandibular or parotid glands  --Ear: (bilateral ears were examined under binocular microscopy)  Right ear microscopic exam:  Pinna: Normal, no lesions or masses.  Mastoid: Nontender on palpation.   External auditory canal: Clear, no masses or lesions.  Tympanic membrane: Intact, no lesions, normal landmarks.  Middle ear: Aerated.    Left ear microscopic exam:  Pinna: Normal, no lesions or masses.  Mastoid: Nontender on palpation.   External auditory canal: Clear, no masses or lesions.  Tympanic membrane: Intact, no lesions, normal landmarks.  Middle ear: Aerated.    ASSESSMENT/PLAN:  Ree Burgos is a 25 year old female with     ICD-10-CM   1. LPRD (laryngopharyngeal reflux disease)  K21.9   2. Tonsil stone  J35.8   3. Teeth clenching  R19.8   4.  TMJPDS (temporomandibular joint pain dysfunction syndrome)  M26.629   5. Bruxism  F45.8        Assessment & Plan  Acid Reflux, LPRD  Intermittent throat discomfort, nasal burning, and tonsil swelling likely due to acid reflux, exacerbated by hormonal changes, stress, and dietary factors like caffeine.  - Educated on dietary modifications for acid reflux.  - Advised reduction or elimination of caffeine intake.  - Discussed hormonal influences with OB GYN.  - Consider medication if symptoms persist despite dietary changes.  - Additional information given to patient    Tonsil Stones  Tonsil swelling likely related to acid reflux, not infection. Tonsil stones present but not causing significant distress.  - Monitor symptoms related to tonsil stones.  - Reassured that tonsil swelling is not problematic unless causing significant distress.    Jaw Clenching and Teeth Grinding  Jaw clenching and teeth grinding may contribute to headaches. Discussed treatment options: physical therapy, muscle relaxants, and Botox. Stretches and physical therapy are initial options.  - Recommend jaw stretches: hold mouth open for two minutes twice daily.  - Provide information on physical therapy for jaw clenching.  - Consider muscle relaxants if symptoms persist.  - Discuss Botox as a potential treatment.    Headaches  Intermittent headaches possibly related to stress, screen time, and jaw clenching, with light sensitivity suggesting migraines. Hormonal changes may exacerbate headaches.  - Advise use of blue-blocking glasses.  - Recommend screen time reduction measures.  - Encourage regular breaks from screen time.  - Discuss hormonal influences with OB GYN.  - Consider discussing headaches with primary care provider if symptoms worsen.    Follow-up as needed     Situation reviewed with the patient in detail.    Hunter Starr MD  Otology/Otolaryngology  [facility] [address] [place] [address]  [id number]  Phone [phone]  Fax [phone]          [1]   No current outpatient medications on file prior to visit.     No current facility-administered medications on file prior to visit.   [2] No Known Allergies